# Patient Record
Sex: MALE | Race: NATIVE HAWAIIAN OR OTHER PACIFIC ISLANDER | Employment: UNEMPLOYED | URBAN - METROPOLITAN AREA
[De-identification: names, ages, dates, MRNs, and addresses within clinical notes are randomized per-mention and may not be internally consistent; named-entity substitution may affect disease eponyms.]

---

## 2017-06-30 ENCOUNTER — APPOINTMENT (OUTPATIENT)
Dept: GENERAL RADIOLOGY | Facility: CLINIC | Age: 42
End: 2017-06-30
Attending: EMERGENCY MEDICINE
Payer: MEDICAID

## 2017-06-30 ENCOUNTER — HOSPITAL ENCOUNTER (EMERGENCY)
Facility: CLINIC | Age: 42
Discharge: HOME | End: 2017-06-30
Attending: EMERGENCY MEDICINE | Admitting: EMERGENCY MEDICINE
Payer: MEDICAID

## 2017-06-30 VITALS
TEMPERATURE: 97.4 F | HEART RATE: 80 BPM | SYSTOLIC BLOOD PRESSURE: 120 MMHG | RESPIRATION RATE: 16 BRPM | OXYGEN SATURATION: 95 % | DIASTOLIC BLOOD PRESSURE: 80 MMHG

## 2017-06-30 DIAGNOSIS — T73.3XXA OVEREXERTION FROM PROLONGED STATIC POSITION: ICD-10-CM
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
## 2017-06-30 DIAGNOSIS — S93.402A SPRAIN OF LEFT ANKLE, UNSPECIFIED LIGAMENT, INITIAL ENCOUNTER: ICD-10-CM

## 2017-06-30 PROCEDURE — 25000132 ZZH RX MED GY IP 250 OP 250 PS 637: Performed by: EMERGENCY MEDICINE

## 2017-06-30 PROCEDURE — 73610 X-RAY EXAM OF ANKLE: CPT | Mod: LT

## 2017-06-30 PROCEDURE — 99283 EMERGENCY DEPT VISIT LOW MDM: CPT

## 2017-06-30 PROCEDURE — 99283 EMERGENCY DEPT VISIT LOW MDM: CPT | Mod: Z6 | Performed by: EMERGENCY MEDICINE

## 2017-06-30 RX ORDER — ACETAMINOPHEN 500 MG
1000 TABLET ORAL ONCE
Status: COMPLETED | OUTPATIENT
Start: 2017-06-30 | End: 2017-06-30

## 2017-06-30 RX ADMIN — ACETAMINOPHEN 1000 MG: 500 TABLET ORAL at 21:03

## 2017-06-30 NOTE — LETTER
[facility], [place], EMERGENCY DEPARTMENT  [address]  [facility] [place] [address]  [phone]    2017    Farshad Gunter  [address] [place] [address]  [phone] (home)     : 1975      To Whom it may concern:    Farshad Gunter was seen in our Emergency Department today, 2017. He has an ankle sprain. Crutches are recommended for the next 3 days.  He may attend classes or any activity which does not require significant ambulation or lifting that would interfere with his use of crutches.       Sincerely,        Jacobo Altamirano

## 2017-06-30 NOTE — ED AVS SNAPSHOT
[facility], [place], Emergency Department    [address]    [facility] [address]    Phone:  [phone]    Fax:  [phone]                                       Farshad Gunter   MRN: [id number]    Department:  [facility], Emergency Department   Date of Visit:  6/30/2017           After Visit Summary Signature Page     I have received my discharge instructions, and my questions have been answered. I have discussed any challenges I see with this plan with the nurse or doctor.    ..........................................................................................................................................  Patient/Patient Representative Signature      ..........................................................................................................................................  Patient Representative Print Name and Relationship to Patient    ..................................................               ................................................  Date                                            Time    ..........................................................................................................................................  Reviewed by Signature/Title    ...................................................              ..............................................  Date                                                            Time

## 2017-07-01 NOTE — DISCHARGE INSTRUCTIONS
Please make an appointment to follow up with Your Primary Care Provider in 7 days even if entirely better.  You can call to discuss the appropriate follow up timing with your doctor.         Treating Ankle Sprains  Treatment will depend on how bad your sprain is. For a severe sprain, healing may take 3 months or more.  Right after your injury: Use R.I.C.E.    Rest: At first, keep weight off the ankle as much as you can. You may be given crutches to help you walk without putting weight on the ankle.    Ice: Put an ice pack on the ankle for 15 minutes. Remove the pack and wait at least 30 minutes. Repeat for up to 3 days. This helps reduce swelling.    Compression: To reduce swelling and keep the joint stable, you may need to wrap the ankle with an elastic bandage. For more severe sprains, you may need an ankle brace or a cast.    Elevation: To reduce swelling, keep your ankle raised above your heart when you sit or lie down.  Medicine  Your healthcare provider may suggest oral non-steroidal anti-inflammatory medicine (NSAIDs), such as ibuprofen. This relieves the pain and helps reduce any swelling. Be sure to take your medicine as directed.  Contrast baths  After 3 days, soak your ankle in warm water for 30 seconds, then in cool water for 30 seconds. Go back and forth for 5 minutes. Doing this every 2 hours will help keep the swelling down.  Exercises    After about 2 to 3 weeks, you may be given exercises to strengthen the ligaments and muscles in the ankle. Doing these exercises will help prevent another ankle sprain. Exercises may include standing on your toes and then on your heels and doing ankle curls.  Ankle curls    Sit on the edge of a sturdy table or lie on your back.    Pull your toes toward you. Then point them away from you. Repeat for 2 to 3 minutes.   Date Last Reviewed: 9/28/2015 2000-2017 [employer]. [address] [place] [address], [place] [address]. All rights reserved. This information is  not intended as a substitute for professional medical care. Always follow your healthcare professional's instructions.

## 2017-07-01 NOTE — ED PROVIDER NOTES
History     Chief Complaint   Patient presents with     Ankle Pain     moving furniture on Monday of this week, trip and rolled left ankle on the the lst couple of stairs; heard a crack; has numbness, burning and tingling in foot; strong pedal pulse; swelling about the left ankle and into foot     HPI  Farshad Gunter is a 41-year-old male no past medical history presents for left ankle pain. Inverted his ankle while moving heavy furniture 4 days ago. Able to ambulate but progressively experienced increasing pain now difficult to walk. No fevers or chills no other trauma no rashes otherwise well.   This part of the document was transcribed by Cristian Ortiz Scribe.     I have reviewed the Medications, Allergies, Past Medical and Surgical History, and Social History in the [employer] system.    PAST MEDICAL HISTORY:   Past Medical History:   Diagnosis Date     Diverticulitis      Injury of right hand      Obesity        PAST SURGICAL HISTORY:   Past Surgical History:   Procedure Laterality Date     C LAP,SURG,COLECTOMY, PARTIAL, W/ANAST  11/23/12    [place]     LAPAROSCOPIC CHOLECYSTECTOMY WITH CHOLANGIOGRAMS  6/12/2013    Procedure: LAPAROSCOPIC CHOLECYSTECTOMY WITH CHOLANGIOGRAMS;  Laparoscopic Cholecystectomy with Cholangiograms;  Surgeon: Thalia Levy MD;  Location:  OR     OPEN REDUCTION INTERNAL FIXATION FINGER(S)  2/17/2012    Procedure:OPEN REDUCTION INTERNAL FIXATION FINGER(S); Open Reduction Internal Fixation of Fourth Metacarpal Fracture; Surgeon:LEXIE KENT; Location: OR     ORTHOPEDIC SURGERY      Right hand surgery in 1996.       FAMILY HISTORY: No family history on file.    SOCIAL HISTORY:   Social History   Substance Use Topics     Smoking status: Former Smoker     Packs/day: 0.10     Types: Cigarettes     Smokeless tobacco: Former User     Alcohol use No      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         Review of Systems   12 point review of symptoms was performed and  is negative except as noted above.     Physical Exam   BP: 108/66  Pulse: 75  Temp: 97.4  F (36.3  C)  Resp: 16  SpO2: 96 %  Physical Exam  GEN: Well appearing, non toxic, cooperative and conversant.   HEENT: The head is normocephalic and atraumatic. Pupils are equal round and reactive to light. Extraocular motions are intact. There is no facial swelling. The neck is nontender and supple.   EXT: left ankle tenderness to palpation along the medial and lateral malleoli with associated swelling. No gross deformity. EHL FHL TA 5 out of 5 sensory intact to light touch DP 2+, range of motion limited by pain. No tenderness to palpation along the midfoot forefoot or calcaneus or base of the fifth metatarsal.   NEURO: Cranial nerves II through XII are intact and symmetric. Bilateral upper and lower extremities grossly show full range of motion without any focal deficits.   SKIN: No rashes, ecchymosis, or lacerations  PSYCH: Calm and cooperative, interactive.     ED Course     ED Course     Procedures        Recent Results (from the past 24 hour(s))   Ankle XR, G/E 3 views, left    Narrative    LEFT ANKLE THREE VIEWS   6/30/2017 7:31 PM     HISTORY: Inversion injury. Ankle pain.    COMPARISON: None.      Impression    IMPRESSION:   1. No convincing acute fracture or malalignment of the left ankle.  2. A 0.5 cm ossific opacity at the inferior tip of the medial  malleolus and a few tiny ossific opacities at inferior tip of the  lateral malleolus likely relate to prior avulsion injury.  3. Moderate soft tissue swelling over the lateral malleolus.  4. Mild degenerative changes in the left tibiotalar joint.   5. Plantar calcaneal spur.    WILI OWENS MD            Labs Ordered and Resulted from Time of ED Arrival Up to the Time of Departure from the ED - No data to display         Assessments & Plan (with Medical Decision Making)   41-year-old male with ankle inversion is noted  X-ray shows no acute fracture  Pain treated with  Tylenol, Ibuprofen  - RICE  - WBAT, crutches PRN comfort- rec use for 3 days for ligamentous rest.   - PCP 1 week    - Patient ready and eager for discharge. Care plan, follow up plan, and reasons to return immediately to the ED were dicussed with the patient and summarized as noted in the discharge instructions.        This part of the document was transcribed by Lorenza Mckeon Medical Scribe.       I have reviewed the nursing notes.    I have reviewed the findings, diagnosis, plan and need for follow up with the patient.    New Prescriptions    No medications on file       Final diagnoses:   Sprain of left ankle, unspecified ligament, initial encounter       6/30/2017   [facility], [place], EMERGENCY DEPARTMENT     Jacobo Altamirano MD  06/30/17 6633

## 2017-11-11 ENCOUNTER — APPOINTMENT (OUTPATIENT)
Dept: CT IMAGING | Facility: CLINIC | Age: 42
End: 2017-11-11
Attending: FAMILY MEDICINE
Payer: MEDICAID

## 2017-11-11 ENCOUNTER — HOSPITAL ENCOUNTER (EMERGENCY)
Facility: CLINIC | Age: 42
Discharge: HOME | End: 2017-11-11
Attending: FAMILY MEDICINE | Admitting: FAMILY MEDICINE
Payer: MEDICAID

## 2017-11-11 VITALS
BODY MASS INDEX: 42.67 KG/M2 | RESPIRATION RATE: 16 BRPM | OXYGEN SATURATION: 99 % | TEMPERATURE: 98 F | SYSTOLIC BLOOD PRESSURE: 114 MMHG | WEIGHT: 297.4 LBS | HEART RATE: 90 BPM | DIASTOLIC BLOOD PRESSURE: 66 MMHG

## 2017-11-11 DIAGNOSIS — R10.32 ABDOMINAL PAIN, LEFT LOWER QUADRANT: ICD-10-CM

## 2017-11-11 DIAGNOSIS — K92.1 BLOOD IN STOOL: ICD-10-CM

## 2017-11-11 DIAGNOSIS — F10.10 ALCOHOL ABUSE, EPISODIC DRINKING BEHAVIOR: ICD-10-CM

## 2017-11-11 LAB
ABO + RH BLD: NORMAL
ABO + RH BLD: NORMAL
ALBUMIN SERPL-MCNC: 2.8 G/DL (ref 3.4–5)
ALP SERPL-CCNC: 105 U/L (ref 40–150)
ALT SERPL W P-5'-P-CCNC: 179 U/L (ref 0–70)
ANION GAP SERPL CALCULATED.3IONS-SCNC: 7 MMOL/L (ref 3–14)
AST SERPL W P-5'-P-CCNC: 439 U/L (ref 0–45)
BASOPHILS # BLD AUTO: 0.1 10E9/L (ref 0–0.2)
BASOPHILS NFR BLD AUTO: 0.6 %
BILIRUB SERPL-MCNC: 0.4 MG/DL (ref 0.2–1.3)
BLD GP AB SCN SERPL QL: NORMAL
BLOOD BANK CMNT PATIENT-IMP: NORMAL
BUN SERPL-MCNC: 11 MG/DL (ref 7–30)
CALCIUM SERPL-MCNC: 7.8 MG/DL (ref 8.5–10.1)
CHLORIDE SERPL-SCNC: 112 MMOL/L (ref 94–109)
CO2 SERPL-SCNC: 26 MMOL/L (ref 20–32)
CREAT SERPL-MCNC: 0.99 MG/DL (ref 0.66–1.25)
DIFFERENTIAL METHOD BLD: ABNORMAL
EOSINOPHIL # BLD AUTO: 0.2 10E9/L (ref 0–0.7)
EOSINOPHIL NFR BLD AUTO: 2.4 %
ERYTHROCYTE [DISTWIDTH] IN BLOOD BY AUTOMATED COUNT: 15.9 % (ref 10–15)
GFR SERPL CREATININE-BSD FRML MDRD: 82 ML/MIN/1.7M2
GLUCOSE SERPL-MCNC: 128 MG/DL (ref 70–99)
HCT VFR BLD AUTO: 43.5 % (ref 40–53)
HGB BLD-MCNC: 14.1 G/DL (ref 13.3–17.7)
IMM GRANULOCYTES # BLD: 0.1 10E9/L (ref 0–0.4)
IMM GRANULOCYTES NFR BLD: 0.5 %
LACTATE BLD-SCNC: 1.9 MMOL/L (ref 0.7–2)
LIPASE SERPL-CCNC: 283 U/L (ref 73–393)
LYMPHOCYTES # BLD AUTO: 2.2 10E9/L (ref 0.8–5.3)
LYMPHOCYTES NFR BLD AUTO: 23.2 %
MCH RBC QN AUTO: 28.7 PG (ref 26.5–33)
MCHC RBC AUTO-ENTMCNC: 32.4 G/DL (ref 31.5–36.5)
MCV RBC AUTO: 88 FL (ref 78–100)
MONOCYTES # BLD AUTO: 0.6 10E9/L (ref 0–1.3)
MONOCYTES NFR BLD AUTO: 5.8 %
NEUTROPHILS # BLD AUTO: 6.4 10E9/L (ref 1.6–8.3)
NEUTROPHILS NFR BLD AUTO: 67.5 %
NRBC # BLD AUTO: 0 10*3/UL
NRBC BLD AUTO-RTO: 0 /100
PLATELET # BLD AUTO: 203 10E9/L (ref 150–450)
POTASSIUM SERPL-SCNC: 4.1 MMOL/L (ref 3.4–5.3)
PROT SERPL-MCNC: 6.4 G/DL (ref 6.8–8.8)
RBC # BLD AUTO: 4.92 10E12/L (ref 4.4–5.9)
SODIUM SERPL-SCNC: 145 MMOL/L (ref 133–144)
SPECIMEN EXP DATE BLD: NORMAL
WBC # BLD AUTO: 9.5 10E9/L (ref 4–11)

## 2017-11-11 PROCEDURE — 25000125 ZZHC RX 250: Performed by: FAMILY MEDICINE

## 2017-11-11 PROCEDURE — 86900 BLOOD TYPING SEROLOGIC ABO: CPT | Performed by: FAMILY MEDICINE

## 2017-11-11 PROCEDURE — 96376 TX/PRO/DX INJ SAME DRUG ADON: CPT

## 2017-11-11 PROCEDURE — 99285 EMERGENCY DEPT VISIT HI MDM: CPT | Mod: Z6 | Performed by: FAMILY MEDICINE

## 2017-11-11 PROCEDURE — 96361 HYDRATE IV INFUSION ADD-ON: CPT | Mod: 59

## 2017-11-11 PROCEDURE — 25000128 H RX IP 250 OP 636: Performed by: FAMILY MEDICINE

## 2017-11-11 PROCEDURE — 80053 COMPREHEN METABOLIC PANEL: CPT | Performed by: FAMILY MEDICINE

## 2017-11-11 PROCEDURE — 96375 TX/PRO/DX INJ NEW DRUG ADDON: CPT

## 2017-11-11 PROCEDURE — 96374 THER/PROPH/DIAG INJ IV PUSH: CPT | Mod: 59

## 2017-11-11 PROCEDURE — 83605 ASSAY OF LACTIC ACID: CPT | Performed by: FAMILY MEDICINE

## 2017-11-11 PROCEDURE — 86901 BLOOD TYPING SEROLOGIC RH(D): CPT | Performed by: FAMILY MEDICINE

## 2017-11-11 PROCEDURE — 85025 COMPLETE CBC W/AUTO DIFF WBC: CPT | Performed by: FAMILY MEDICINE

## 2017-11-11 PROCEDURE — 86850 RBC ANTIBODY SCREEN: CPT | Performed by: FAMILY MEDICINE

## 2017-11-11 PROCEDURE — 83690 ASSAY OF LIPASE: CPT | Performed by: FAMILY MEDICINE

## 2017-11-11 PROCEDURE — 99285 EMERGENCY DEPT VISIT HI MDM: CPT | Mod: 25

## 2017-11-11 PROCEDURE — 74177 CT ABD & PELVIS W/CONTRAST: CPT

## 2017-11-11 RX ORDER — SODIUM CHLORIDE 9 MG/ML
1000 INJECTION, SOLUTION INTRAVENOUS CONTINUOUS
Status: DISCONTINUED | OUTPATIENT
Start: 2017-11-11 | End: 2017-11-12 | Stop reason: HOSPADM

## 2017-11-11 RX ORDER — HYDROMORPHONE HYDROCHLORIDE 1 MG/ML
0.5 INJECTION, SOLUTION INTRAMUSCULAR; INTRAVENOUS; SUBCUTANEOUS ONCE
Status: COMPLETED | OUTPATIENT
Start: 2017-11-11 | End: 2017-11-11

## 2017-11-11 RX ORDER — HYDROMORPHONE HCL/0.9% NACL/PF 0.2MG/0.2
0.2 SYRINGE (ML) INTRAVENOUS
Status: COMPLETED | OUTPATIENT
Start: 2017-11-11 | End: 2017-11-11

## 2017-11-11 RX ORDER — PANTOPRAZOLE SODIUM 40 MG/1
40 TABLET, DELAYED RELEASE ORAL DAILY
Qty: 30 TABLET | Refills: 0 | Status: SHIPPED | OUTPATIENT
Start: 2017-11-11 | End: 2017-12-11

## 2017-11-11 RX ORDER — IOPAMIDOL 755 MG/ML
100 INJECTION, SOLUTION INTRAVASCULAR ONCE
Status: COMPLETED | OUTPATIENT
Start: 2017-11-11 | End: 2017-11-11

## 2017-11-11 RX ORDER — ONDANSETRON 2 MG/ML
4 INJECTION INTRAMUSCULAR; INTRAVENOUS
Status: COMPLETED | OUTPATIENT
Start: 2017-11-11 | End: 2017-11-11

## 2017-11-11 RX ADMIN — SODIUM CHLORIDE 1000 ML: 9 INJECTION, SOLUTION INTRAVENOUS at 22:02

## 2017-11-11 RX ADMIN — HYDROMORPHONE HYDROCHLORIDE 0.5 MG: 1 INJECTION, SOLUTION INTRAMUSCULAR; INTRAVENOUS; SUBCUTANEOUS at 20:52

## 2017-11-11 RX ADMIN — IOPAMIDOL 98 ML: 755 INJECTION, SOLUTION INTRAVENOUS at 21:23

## 2017-11-11 RX ADMIN — SODIUM CHLORIDE 1000 ML: 9 INJECTION, SOLUTION INTRAVENOUS at 20:15

## 2017-11-11 RX ADMIN — Medication 0.2 MG: at 20:14

## 2017-11-11 RX ADMIN — SODIUM CHLORIDE 74 ML: 9 INJECTION, SOLUTION INTRAVENOUS at 21:23

## 2017-11-11 RX ADMIN — ONDANSETRON 4 MG: 2 INJECTION INTRAMUSCULAR; INTRAVENOUS at 20:14

## 2017-11-11 NOTE — ED AVS SNAPSHOT
[facility], Emergency Department    [address]    [place] [address]    Phone:  [phone]    Fax:  [phone]                                       Farshad Gunter   MRN: [id number]    Department:  [facility], Emergency Department   Date of Visit:  11/11/2017           Patient Information     Date Of Birth          1975        Your diagnoses for this visit were:     Abdominal pain, left lower quadrant     Blood in stool     Alcohol abuse, episodic drinking behavior        You were seen by Cesar Scruggs MD.        Discharge Instructions       Thank you for choosing [facility].     Please closely monitor for further symptoms. Return to the Emergency Department if you develop any new or worsening signs or symptoms.    If you received any opiate pain medications or sedatives during your visit, please do not drive for at least 8 hours.     Labs, cultures or final xray interpretations may still need to be reviewed.  We will call you if your plan of care needs to be changed.    Please make an appointment to follow up with Your Primary Care Provider in the next 5-7 days, and GI Clinic (phone: [phone]) for further evaluation of hematochezia and small mass or nodule seen on your CT scan and discussed with you today, as well as your elevated liver enzymes which are likely due to alcohol abuse.  Please refrain from alcohol and start Protonix.    If you desire chemical dependency assessment or counseling, follow up with [facility]: [phone]      Discharge References/Attachments     ABDOMINAL PAIN, ADULT (ENGLISH)    LOWER GI BLEEDING (STABLE) (ENGLISH)    ALCOHOL ABUSE (ENGLISH)      24 Hour Appointment Hotline       To make an appointment at any [facility] clinic, call [phone] ([phone]). If you don't have a family doctor or clinic, we will help you find one. [facility] clinics are conveniently located to serve the needs of you and your  family.             Review of your medicines      START taking        Dose / Directions Last dose taken    pantoprazole 40 MG EC tablet   Commonly known as:  PROTONIX   Dose:  40 mg   Quantity:  30 tablet        Take 1 tablet (40 mg) by mouth daily for 30 doses   Refills:  0          Our records show that you are taking the medicines listed below. If these are incorrect, please call your family doctor or clinic.        Dose / Directions Last dose taken    IBUPROFEN PO   Dose:  800 mg        Take 800 mg by mouth   Refills:  0        TYLENOL PO        Refills:  0                Prescriptions were sent or printed at these locations (1 Prescription)                   Other Prescriptions                Printed at Department/Unit printer (1 of 1)         pantoprazole (PROTONIX) 40 MG EC tablet                Procedures and tests performed during your visit     ABO/Rh type and screen    CBC with platelets differential    CT Abdomen Pelvis w Contrast    Comprehensive metabolic panel    Give 20 ounces of water 15 minutes before CT of abdomen    Lactic acid whole blood    Lipase      Orders Needing Specimen Collection     None      Pending Results     No orders found from 11/9/2017 to 11/12/2017.            Pending Culture Results     No orders found from 11/9/2017 to 11/12/2017.            Pending Results Instructions     If you had any lab results that were not finalized at the time of your Discharge, you can call the ED Lab Result RN at [phone]. You will be contacted by this team for any positive Lab results or changes in treatment. The nurses are available 7 days a week from 10A to 6:30P.  You can leave a message 24 hours per day and they will return your call.        Thank you for choosing [facility]       Thank you for choosing [facility] for your care. Our goal is always to provide you with excellent care. Hearing back from our patients is one way we can continue to improve our services. Please take a few minutes to  "complete the written survey that you may receive in the mail after you visit with us. Thank you!        [facility] Information     [employer] lets you send messages to your doctor, view your test results, renew your prescriptions, schedule appointments and more. To sign up, go to [email or web address] . Click on \"Log in\" on the left side of the screen, which will take you to the Welcome page. Then click on \"Sign up Now\" on the right side of the page.     You will be asked to enter the access code listed below, as well as some personal information. Please follow the directions to create your username and password.     Your access code is: [email or web address]  Expires: 2018 10:50 PM     Your access code will  in 90 days. If you need help or a new code, please call your [facility] clinic or [phone].        Care EveryWhere ID     This is your Care EveryWhere ID. This could be used by other organizations to access your [facility] medical records  [id number]        Equal Access to Services     [facility] : Hadii guillaume bradshawo Soaleta, waaxda luqadaha, qaybta kaalmada adefareed, bo barba . So [facility] [place] [phone].    ATENCIÓN: Si habla español, tiene a dalton disposición servicios gratuitos de asistencia lingüística. Llame al [phone].    We comply with applicable federal civil rights laws and [place] laws. We do not discriminate on the basis of race, color, national origin, age, disability, sex, sexual orientation, or gender identity.            After Visit Summary       This is your record. Keep this with you and show to your community pharmacist(s) and doctor(s) at your next visit.                  "

## 2017-11-11 NOTE — ED AVS SNAPSHOT
[facility], [place], Emergency Department    [address]    [facility] [address]    Phone:  [phone]    Fax:  [phone]                                       Farshad Gunter   MRN: [id number]    Department:  [facility], Emergency Department   Date of Visit:  11/11/2017           After Visit Summary Signature Page     I have received my discharge instructions, and my questions have been answered. I have discussed any challenges I see with this plan with the nurse or doctor.    ..........................................................................................................................................  Patient/Patient Representative Signature      ..........................................................................................................................................  Patient Representative Print Name and Relationship to Patient    ..................................................               ................................................  Date                                            Time    ..........................................................................................................................................  Reviewed by Signature/Title    ...................................................              ..............................................  Date                                                            Time

## 2017-11-12 NOTE — DISCHARGE INSTRUCTIONS
Thank you for choosing [facility].     Please closely monitor for further symptoms. Return to the Emergency Department if you develop any new or worsening signs or symptoms.    If you received any opiate pain medications or sedatives during your visit, please do not drive for at least 8 hours.     Labs, cultures or final xray interpretations may still need to be reviewed.  We will call you if your plan of care needs to be changed.    Please make an appointment to follow up with Your Primary Care Provider in the next 5-7 days, and GI Clinic (phone: [phone]) for further evaluation of hematochezia and small mass or nodule seen on your CT scan and discussed with you today, as well as your elevated liver enzymes which are likely due to alcohol abuse.  Please refrain from alcohol and start Protonix.    If you desire chemical dependency assessment or counseling, follow up with [facility]: [phone]

## 2017-11-12 NOTE — ED PROVIDER NOTES
"  History     Chief Complaint   Patient presents with     Abdominal Pain     Onset at 4 pm tonight bright red blood noted on toilet paper after having bowel movement, now having abdominal pain.     HPI  Farshad Gunter is a 41 year old male with a past medical history of obesity and diverticulitis who presents to the Emergency Department today for evaluation of abdominal pain and blood per rectum. The patient states that he had a bowel movement around 2 PM today that had blood in the stool and blood in the toilet bowel. He then had another episode of this before coming into the ED. He denies his stool being diarrhea and he states that his stool was otherwise normal besides the blood. He denies having rectal pain with his bowel movement. The patient also complains of abdominal pain that is most significant in his LLQ with some mild pain in his RLQ. He is also having some lightheadedness. Denies chills or fever.  He does admit he has been consuming very large quantities of alcohol in the last few weeks.  Denies any history of DTs or seizures but states he has had a \"ulcer\".    Upon review of Care Everywhere, the patient had a laparoscopic single incision left colectomy converted to open left colectomy done at [facility] in [place] on 11/23/12 for recurrent diverticulitis.     I have reviewed the Medications, Allergies, Past Medical and Surgical History, and Social History in the [facility] system.    Past Medical History:   Diagnosis Date     Diverticulitis      Injury of right hand      Obesity        Past Surgical History:   Procedure Laterality Date     C LAP,SURG,COLECTOMY, PARTIAL, W/ANAST  11/23/12    [place]     LAPAROSCOPIC CHOLECYSTECTOMY WITH CHOLANGIOGRAMS  6/12/2013    Procedure: LAPAROSCOPIC CHOLECYSTECTOMY WITH CHOLANGIOGRAMS;  Laparoscopic Cholecystectomy with Cholangiograms;  Surgeon: Thalia Levy MD;  Location: [facility] OR     OPEN REDUCTION INTERNAL FIXATION FINGER(S)  2/17/2012 "    Procedure:OPEN REDUCTION INTERNAL FIXATION FINGER(S); Open Reduction Internal Fixation of Fourth Metacarpal Fracture; Surgeon:LEXIE KENT; Location:[facility] OR     ORTHOPEDIC SURGERY      Right hand surgery in 1996.       No family history on file.    Social History   Substance Use Topics     Smoking status: Former Smoker     Packs/day: 0.10     Types: Cigarettes     Smokeless tobacco: Former User     Alcohol use No      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       Current Facility-Administered Medications   Medication     0.9% sodium chloride infusion     sodium chloride 0.9 % bag 500mL for CT scan flush use     Current Outpatient Prescriptions   Medication     pantoprazole (PROTONIX) 40 MG EC tablet     IBUPROFEN PO     Acetaminophen (TYLENOL PO)      No Known Allergies     Review of Systems   ROS: 10 point ROS neg other than the symptoms noted above in the HPI.   Physical Exam   BP: 111/64  Pulse: 90  Temp: 98  F (36.7  C)  Resp: 16  Weight: 134.9 kg (297 lb 6.4 oz)  SpO2: 99 %      Physical Exam   Constitutional: He is oriented to person, place, and time. He appears well-developed and well-nourished.   HENT:   Head: Normocephalic and atraumatic.   Mouth/Throat: Oropharynx is clear and moist.   Eyes: EOM are normal. Pupils are equal, round, and reactive to light.   Neck: Normal range of motion. Neck supple. No tracheal deviation present. No thyromegaly present.   Cardiovascular: Normal rate, regular rhythm, normal heart sounds and intact distal pulses.  Exam reveals no gallop and no friction rub.    No murmur heard.  Pulmonary/Chest: Effort normal and breath sounds normal. He exhibits no tenderness.   Abdominal: Soft. Bowel sounds are normal. He exhibits no distension and no mass. There is tenderness in the left lower quadrant. There is no rigidity, no rebound and no CVA tenderness.   Genitourinary: Rectum normal, testes normal and penis normal. Rectal exam shows no internal hemorrhoid, no mass, no tenderness, anal  tone normal and guaiac negative stool.   Musculoskeletal: He exhibits no edema or tenderness.   Neurological: He is alert and oriented to person, place, and time. No cranial nerve deficit. Coordination normal.   Skin: Skin is warm and dry. No rash noted.   Psychiatric: He has a normal mood and affect. His behavior is normal.   Nursing note and vitals reviewed.      ED Course   7:39 PM  The patient was seen and examined by Dr. Scruggs in Room 18.    ED Course     Procedures             Critical Care time:  none             Labs Ordered and Resulted from Time of ED Arrival Up to the Time of Departure from the ED   CBC WITH PLATELETS DIFFERENTIAL - Abnormal; Notable for the following:        Result Value    RDW 15.9 (*)     All other components within normal limits   COMPREHENSIVE METABOLIC PANEL - Abnormal; Notable for the following:     Sodium 145 (*)     Chloride 112 (*)     Glucose 128 (*)     Calcium 7.8 (*)     Albumin 2.8 (*)     Protein Total 6.4 (*)      (*)      (*)     All other components within normal limits   LACTIC ACID WHOLE BLOOD   LIPASE   FREE WATER   ABO/RH TYPE AND SCREEN            Assessments & Plan (with Medical Decision Making)   Patient with a previous history of diverticulitis now presenting with left sided abdominal pain and hematochezia.  Differential diagnosis includes gastritis or peptic ulcer disease, duodenitis, colitis, diverticulitis, internal hemorrhoid, anal fissure, pancreatitis, hepatitis, small bowel obstruction, vascular catastrophe.  Patient's vital signs are normal.  He appears in no distress and his cardiovascular exam is normal.  He has tenderness in the left lower quadrant of his abdomen without peritoneal signs.  His rectal exam is normal, with Hemoccult negative stool.  His white blood cell count hemoglobin and platelets are all normal.  Comprehensive metabolic panel consistent with his known history of recent heavy alcohol consumption.  He is not acutely  intoxicated.  His lactate and lipase are normal.  His CT of the abdomen and pelvis does not show any findings concerning for left lower quadrant pain or hematochezia.  There is a small 1.4 cm mass or nodule along the anterior proximal duodenum which is not likely to be related to his acute symptoms but will need further evaluation possibly with endoscopy.  There are no findings to support any of the eye threatening considerations for his acute symptoms and no sign of hemodynamically unstable GI bleed.  Patient reported significant resolution of his symptoms while in the emergency department and certainly had no further episodes of hematochezia and remained hemodynamically stable.  He does appear stable to proceed for further evaluation as outpatient.  I have discussed the small mass or nodule with the patient and his family and they assure me he will [facility].  I will start him on a proton pump inhibitor and he agrees to refrain from alcohol.  Discussed expected course, need for follow up, and indications for return with the patient.  See discharge instructions.      I have reviewed the nursing notes.    I have reviewed the findings, diagnosis, plan and need for follow up with the patient.    New Prescriptions    PANTOPRAZOLE (PROTONIX) 40 MG EC TABLET    Take 1 tablet (40 mg) by mouth daily for 30 doses       Final diagnoses:   Abdominal pain, left lower quadrant   Blood in stool   Alcohol abuse, episodic drinking behavior   I, Julio Cesar Lopez, am serving as a trained medical scribe to document services personally performed by Ramana Scruggs MD, based on the provider's statements to me.   Ramana RUSSO MD, was physically present and have reviewed and verified the accuracy of this note documented by Julio Cesar Lopez.     11/11/2017   [facility], EMERGENCY DEPARTMENT     Cesar Scruggs MD  11/11/17 1203

## 2019-09-14 ENCOUNTER — HOSPITAL ENCOUNTER (EMERGENCY)
Facility: CLINIC | Age: 44
Discharge: HOME | End: 2019-09-14
Attending: EMERGENCY MEDICINE | Admitting: EMERGENCY MEDICINE
Payer: COMMERCIAL

## 2019-09-14 VITALS
RESPIRATION RATE: 18 BRPM | TEMPERATURE: 97.6 F | SYSTOLIC BLOOD PRESSURE: 136 MMHG | WEIGHT: 263 LBS | HEART RATE: 84 BPM | OXYGEN SATURATION: 98 % | HEIGHT: 69 IN | BODY MASS INDEX: 38.95 KG/M2 | DIASTOLIC BLOOD PRESSURE: 78 MMHG

## 2019-09-14 DIAGNOSIS — R09.81 NASAL CONGESTION: ICD-10-CM

## 2019-09-14 DIAGNOSIS — K12.2 UVULITIS: ICD-10-CM

## 2019-09-14 LAB
ANION GAP SERPL CALCULATED.3IONS-SCNC: 6 MMOL/L (ref 3–14)
BASOPHILS # BLD AUTO: 0.1 10E9/L (ref 0–0.2)
BASOPHILS NFR BLD AUTO: 0.6 %
BUN SERPL-MCNC: 9 MG/DL (ref 7–30)
CALCIUM SERPL-MCNC: 8.4 MG/DL (ref 8.5–10.1)
CHLORIDE SERPL-SCNC: 107 MMOL/L (ref 94–109)
CO2 SERPL-SCNC: 28 MMOL/L (ref 20–32)
CREAT SERPL-MCNC: 0.96 MG/DL (ref 0.66–1.25)
CRP SERPL-MCNC: 11 MG/L (ref 0–8)
DIFFERENTIAL METHOD BLD: ABNORMAL
EOSINOPHIL # BLD AUTO: 0.2 10E9/L (ref 0–0.7)
EOSINOPHIL NFR BLD AUTO: 1.6 %
ERYTHROCYTE [DISTWIDTH] IN BLOOD BY AUTOMATED COUNT: 14.6 % (ref 10–15)
GFR SERPL CREATININE-BSD FRML MDRD: >90 ML/MIN/{1.73_M2}
GLUCOSE SERPL-MCNC: 110 MG/DL (ref 70–99)
HCT VFR BLD AUTO: 52.6 % (ref 40–53)
HGB BLD-MCNC: 16.5 G/DL (ref 13.3–17.7)
IMM GRANULOCYTES # BLD: 0.1 10E9/L (ref 0–0.4)
IMM GRANULOCYTES NFR BLD: 0.5 %
LYMPHOCYTES # BLD AUTO: 1.5 10E9/L (ref 0.8–5.3)
LYMPHOCYTES NFR BLD AUTO: 14 %
MCH RBC QN AUTO: 28.4 PG (ref 26.5–33)
MCHC RBC AUTO-ENTMCNC: 31.4 G/DL (ref 31.5–36.5)
MCV RBC AUTO: 91 FL (ref 78–100)
MONOCYTES # BLD AUTO: 0.5 10E9/L (ref 0–1.3)
MONOCYTES NFR BLD AUTO: 4.2 %
NEUTROPHILS # BLD AUTO: 8.4 10E9/L (ref 1.6–8.3)
NEUTROPHILS NFR BLD AUTO: 79.1 %
NRBC # BLD AUTO: 0 10*3/UL
NRBC BLD AUTO-RTO: 0 /100
PLATELET # BLD AUTO: 224 10E9/L (ref 150–450)
POTASSIUM SERPL-SCNC: 3.6 MMOL/L (ref 3.4–5.3)
RBC # BLD AUTO: 5.81 10E12/L (ref 4.4–5.9)
SODIUM SERPL-SCNC: 141 MMOL/L (ref 133–144)
WBC # BLD AUTO: 10.6 10E9/L (ref 4–11)

## 2019-09-14 PROCEDURE — 85025 COMPLETE CBC W/AUTO DIFF WBC: CPT | Performed by: EMERGENCY MEDICINE

## 2019-09-14 PROCEDURE — 99285 EMERGENCY DEPT VISIT HI MDM: CPT | Mod: Z6 | Performed by: EMERGENCY MEDICINE

## 2019-09-14 PROCEDURE — 31575 DIAGNOSTIC LARYNGOSCOPY: CPT

## 2019-09-14 PROCEDURE — 80048 BASIC METABOLIC PNL TOTAL CA: CPT | Performed by: EMERGENCY MEDICINE

## 2019-09-14 PROCEDURE — 96375 TX/PRO/DX INJ NEW DRUG ADDON: CPT | Mod: 59

## 2019-09-14 PROCEDURE — 99285 EMERGENCY DEPT VISIT HI MDM: CPT | Mod: 25

## 2019-09-14 PROCEDURE — 96374 THER/PROPH/DIAG INJ IV PUSH: CPT

## 2019-09-14 PROCEDURE — 25000132 ZZH RX MED GY IP 250 OP 250 PS 637: Performed by: EMERGENCY MEDICINE

## 2019-09-14 PROCEDURE — 86140 C-REACTIVE PROTEIN: CPT | Performed by: EMERGENCY MEDICINE

## 2019-09-14 PROCEDURE — 25000128 H RX IP 250 OP 636: Performed by: EMERGENCY MEDICINE

## 2019-09-14 RX ORDER — PSEUDOEPHEDRINE HCL 30 MG
30 TABLET ORAL EVERY 4 HOURS PRN
Qty: 30 TABLET | Refills: 0 | Status: SHIPPED | OUTPATIENT
Start: 2019-09-14

## 2019-09-14 RX ORDER — DIPHENHYDRAMINE HYDROCHLORIDE 50 MG/ML
25 INJECTION INTRAMUSCULAR; INTRAVENOUS ONCE
Status: COMPLETED | OUTPATIENT
Start: 2019-09-14 | End: 2019-09-14

## 2019-09-14 RX ORDER — KETOROLAC TROMETHAMINE 30 MG/ML
30 INJECTION, SOLUTION INTRAMUSCULAR; INTRAVENOUS ONCE
Status: COMPLETED | OUTPATIENT
Start: 2019-09-14 | End: 2019-09-14

## 2019-09-14 RX ORDER — METHYLPREDNISOLONE 4 MG
TABLET, DOSE PACK ORAL
Qty: 21 TABLET | Refills: 0 | Status: SHIPPED | OUTPATIENT
Start: 2019-09-14

## 2019-09-14 RX ORDER — DEXAMETHASONE SODIUM PHOSPHATE 10 MG/ML
10 INJECTION, SOLUTION INTRAMUSCULAR; INTRAVENOUS ONCE
Status: COMPLETED | OUTPATIENT
Start: 2019-09-14 | End: 2019-09-14

## 2019-09-14 RX ORDER — IBUPROFEN 600 MG/1
600 TABLET, FILM COATED ORAL EVERY 8 HOURS PRN
Qty: 30 TABLET | Refills: 0 | Status: SHIPPED | OUTPATIENT
Start: 2019-09-14

## 2019-09-14 RX ADMIN — KETOROLAC TROMETHAMINE 30 MG: 30 INJECTION, SOLUTION INTRAMUSCULAR at 11:51

## 2019-09-14 RX ADMIN — DIPHENHYDRAMINE HYDROCHLORIDE 25 MG: 50 INJECTION, SOLUTION INTRAMUSCULAR; INTRAVENOUS at 11:50

## 2019-09-14 RX ADMIN — Medication 1 SPRAY: at 14:28

## 2019-09-14 RX ADMIN — DEXAMETHASONE SODIUM PHOSPHATE 10 MG: 10 INJECTION, SOLUTION INTRAMUSCULAR; INTRAVENOUS at 11:50

## 2019-09-14 ASSESSMENT — MIFFLIN-ST. JEOR: SCORE: 2073.34

## 2019-09-14 ASSESSMENT — ENCOUNTER SYMPTOMS
SORE THROAT: 1
TROUBLE SWALLOWING: 1

## 2019-09-14 NOTE — ED TRIAGE NOTES
Pt presents to ED with swollen throat. Started having sore throat last night and woke up this am with swelling, SOB, and voice changes. Has never happened before.

## 2019-09-14 NOTE — ED PROVIDER NOTES
History     Chief Complaint   Patient presents with     Oral Swelling     HPI  Farshad Gunter is a 43 year old male with a history of obesity and diverticulitis, who presents to the Emergency Department for evaluation of oral swelling. Patient reports that he went to the movies last night. He developed oral discomfort afterwards and this morning noticed the swelling of the back of his throat. He continues to have pain. He is unable to swallow his secretions. He has never had this. He denies any tongue swelling. He denies any coughs or trismus. He had rhinorrhea this morning along with shortness of breath. He denies starting any new medications. He reportedly took Trazodone this morning.     I have reviewed the Medications, Allergies, Past Medical and Surgical History, and Social History in the [facility] system.    Past Medical History:   Diagnosis Date     Diverticulitis      Injury of right hand      Obesity        Past Surgical History:   Procedure Laterality Date     C LAP,SURG,COLECTOMY, PARTIAL, W/ANAST  11/23/12    [place]     LAPAROSCOPIC CHOLECYSTECTOMY WITH CHOLANGIOGRAMS  6/12/2013    Procedure: LAPAROSCOPIC CHOLECYSTECTOMY WITH CHOLANGIOGRAMS;  Laparoscopic Cholecystectomy with Cholangiograms;  Surgeon: Thalia Levy MD;  Location:  OR     OPEN REDUCTION INTERNAL FIXATION FINGER(S)  2/17/2012    Procedure:OPEN REDUCTION INTERNAL FIXATION FINGER(S); Open Reduction Internal Fixation of Fourth Metacarpal Fracture; Surgeon:LEXIE KENT; Location: OR     ORTHOPEDIC SURGERY      Right hand surgery in 1996.       History reviewed. No pertinent family history.    Social History     Tobacco Use     Smoking status: Former Smoker     Packs/day: 0.10     Types: Cigarettes     Smokeless tobacco: Former User   Substance Use Topics     Alcohol use: No     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     No current facility-administered medications for this encounter.      Current Outpatient Medications  "  Medication     Acetaminophen (TYLENOL PO)     IBUPROFEN PO      No Known Allergies      Review of Systems   HENT: Positive for sore throat and trouble swallowing.         Positive for oral swelling   All other systems reviewed and are negative.      Physical Exam   BP: 126/85  Pulse: 84  Temp: 97.6  F (36.4  C)  Resp: 18  Height: 175.3 cm (5' 9\")  Weight: 119.3 kg (263 lb)  SpO2: 99 %      Physical Exam   Constitutional: He is oriented to person, place, and time. He appears well-developed and well-nourished.   HENT:   Head: Normocephalic and atraumatic.   Enlarged uvula x 4 normal size;  Fluid filled and appears like large teardrop; no exudate or swelling or infection of tonsils; no trismus; no erythema of posterior pharynx; large overall size of tongue but no swelling; able to fully open mouth and move neck;  Some sputum is being suctioned   Neck: Normal range of motion. Neck supple.   Cardiovascular: Normal rate, regular rhythm and normal heart sounds.   Pulmonary/Chest: Effort normal. No stridor. No respiratory distress. He has no wheezes.   Abdominal: Soft. He exhibits no distension. There is no tenderness. There is no rebound.   Neurological: He is alert and oriented to person, place, and time.   Skin: Skin is warm.   Psychiatric: He has a normal mood and affect. His behavior is normal. Thought content normal.       ED Course        Procedures             Critical Care time:  none         Results for orders placed or performed during the hospital encounter of 09/14/19   CBC with platelets differential   Result Value Ref Range    WBC 10.6 4.0 - 11.0 10e9/L    RBC Count 5.81 4.4 - 5.9 10e12/L    Hemoglobin 16.5 13.3 - 17.7 g/dL    Hematocrit 52.6 40.0 - 53.0 %    MCV 91 78 - 100 fl    MCH 28.4 26.5 - 33.0 pg    MCHC 31.4 (L) 31.5 - 36.5 g/dL    RDW 14.6 10.0 - 15.0 %    Platelet Count 224 150 - 450 10e9/L    Diff Method Automated Method     % Neutrophils 79.1 %    % Lymphocytes 14.0 %    % Monocytes 4.2 %    % " Eosinophils 1.6 %    % Basophils 0.6 %    % Immature Granulocytes 0.5 %    Nucleated RBCs 0 0 /100    Absolute Neutrophil 8.4 (H) 1.6 - 8.3 10e9/L    Absolute Lymphocytes 1.5 0.8 - 5.3 10e9/L    Absolute Monocytes 0.5 0.0 - 1.3 10e9/L    Absolute Eosinophils 0.2 0.0 - 0.7 10e9/L    Absolute Basophils 0.1 0.0 - 0.2 10e9/L    Abs Immature Granulocytes 0.1 0 - 0.4 10e9/L    Absolute Nucleated RBC 0.0    Basic metabolic panel   Result Value Ref Range    Sodium 141 133 - 144 mmol/L    Potassium 3.6 3.4 - 5.3 mmol/L    Chloride 107 94 - 109 mmol/L    Carbon Dioxide 28 20 - 32 mmol/L    Anion Gap 6 3 - 14 mmol/L    Glucose 110 (H) 70 - 99 mg/dL    Urea Nitrogen 9 7 - 30 mg/dL    Creatinine 0.96 0.66 - 1.25 mg/dL    GFR Estimate >90 >60 mL/min/[1.73_m2]    GFR Estimate If Black >90 >60 mL/min/[1.73_m2]    Calcium 8.4 (L) 8.5 - 10.1 mg/dL   CRP inflammation   Result Value Ref Range    CRP Inflammation 11.0 (H) 0.0 - 8.0 mg/L     Medications   dexamethasone PF (DECADRON) injection 10 mg (10 mg Intravenous Given 9/14/19 1150)   ketorolac (TORADOL) injection 30 mg (30 mg Intravenous Given 9/14/19 1151)   diphenhydrAMINE (BENADRYL) injection 25 mg (25 mg Intravenous Given 9/14/19 1150)   phenylephrine (KAI-SYNEPHRINE) 0.25 % spray 1 spray (1 spray Nasal Given 9/14/19 1428)            Labs Ordered and Resulted from Time of ED Arrival Up to the Time of Departure from the ED - No data to display         Assessments & Plan (with Medical Decision Making)   Is a very nice 43-year-old male who presented to the ER complaining of waking up with sore throat difficulty swallowing.  Patient on exam and a very enlarged uvula with no other signs of deep infection of the posterior pharynx.  Patient was seen by ENT who came and scoped the patient.  They agree that patient has no symptoms below the uvula but does have blocked left nasopharyngeal passage as well as an enlarged uvula.  They are unsure what the symptoms are from but recommend a  Medrol Dosepak.  Symptoms are possibly due to a viral infection.  Patient here was monitored in the ER for several hours to make sure his symptoms are improved.  Upon 2 other visits patient's uvula slowly started to improve and patient was feeling better.  He was able to swallow his sputum and was able to drink liquids.  Patient received some Afrin and felt like his nasal congestion had improved.  Patient will be discharged home instructions to follow-up as needed.  Patient stable for discharge    I have reviewed the nursing notes.    I have reviewed the findings, diagnosis, plan and need for follow up with the patient.    New Prescriptions    No medications on file       Final diagnoses:   Uvulitis   Nasal congestion     IAlba, am serving as a trained medical scribe to document services personally performed by Sahra Rodrigues MD, based on the provider's statements to me.   Sahra RUSSO MD, was physically present and have reviewed and verified the accuracy of this note documented by Alba Carver.    9/14/2019   [facility], [place], EMERGENCY DEPARTMENT     Sahra Rodrigues MD  09/14/19 1304

## 2019-09-14 NOTE — ED AVS SNAPSHOT
[facility], [place], Emergency Department  [address] [place] [id number]  Phone:  [phone]                                    Farshad Gunter   MRN: [id number]    Department:  [facility], Emergency Department   Date of Visit:  9/14/2019           After Visit Summary Signature Page    I have received my discharge instructions, and my questions have been answered. I have discussed any challenges I see with this plan with the nurse or doctor.    ..........................................................................................................................................  Patient/Patient Representative Signature      ..........................................................................................................................................  Patient Representative Print Name and Relationship to Patient    ..................................................               ................................................  Date                                   Time    ..........................................................................................................................................  Reviewed by Signature/Title    ...................................................              ..............................................  Date                                               Time          22EPIC Rev 08/18

## 2019-09-14 NOTE — CONSULTS
Otolaryngology Consult Note  September 14, 2019    CC: uvular swelling    HPI: Farshad Gunter is a 43 year old male who is otherwise healthy.  He states that he was at a movie last night and after the movie around midnight he started having a sore throat and felt like he could not breathe out of the left nostril.  He went to bed he woke up this morning with slightly increased sore throat and a feeling that it was difficult to breathe especially with laying back.  He felt like his voice was a little bit more muffled this morning as well states that is since improved.  He never had any noisy breathing throughout this process.  States that he otherwise has been feeling healthy no fevers or chills.  He does not have a history of this in the past.  He did not inhale any medications. He does not have any food allergies or family history of angioedema.     Past Medical History:   Diagnosis Date     Diverticulitis      Injury of right hand      Obesity      Past Surgical History:   Procedure Laterality Date     C LAP,SURG,COLECTOMY, PARTIAL, W/ANAST  11/23/12    [place]     LAPAROSCOPIC CHOLECYSTECTOMY WITH CHOLANGIOGRAMS  6/12/2013    Procedure: LAPAROSCOPIC CHOLECYSTECTOMY WITH CHOLANGIOGRAMS;  Laparoscopic Cholecystectomy with Cholangiograms;  Surgeon: Thalia Levy MD;  Location:  OR     OPEN REDUCTION INTERNAL FIXATION FINGER(S)  2/17/2012    Procedure:OPEN REDUCTION INTERNAL FIXATION FINGER(S); Open Reduction Internal Fixation of Fourth Metacarpal Fracture; Surgeon:LEXIE KENT; Location: OR     ORTHOPEDIC SURGERY      Right hand surgery in 1996.     Current Outpatient Medications   Medication Sig Dispense Refill     Acetaminophen (TYLENOL PO)        IBUPROFEN PO Take 800 mg by mouth        No Known Allergies    Social History     Socioeconomic History     Marital status: Single     Spouse name: Not on file     Number of children: Not on file     Years of education: Not on file     Highest  "education level: Not on file   Occupational History     Not on file   Social Needs     Financial resource strain: Not on file     Food insecurity:     Worry: Not on file     Inability: Not on file     Transportation needs:     Medical: Not on file     Non-medical: Not on file   Tobacco Use     Smoking status: Former Smoker     Packs/day: 0.10     Types: Cigarettes     Smokeless tobacco: Former User   Substance and Sexual Activity     Alcohol use: No     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     Drug use: No     Sexual activity: Yes     Partners: Female   Lifestyle     Physical activity:     Days per week: Not on file     Minutes per session: Not on file     Stress: Not on file   Relationships     Social connections:     Talks on phone: Not on file     Gets together: Not on file     Attends Congregational service: Not on file     Active member of club or organization: Not on file     Attends meetings of clubs or organizations: Not on file     Relationship status: Not on file     Intimate partner violence:     Fear of current or ex partner: Not on file     Emotionally abused: Not on file     Physically abused: Not on file     Forced sexual activity: Not on file   Other Topics Concern     Parent/sibling w/ CABG, MI or angioplasty before 65F 55M? Not Asked   Social History Narrative     Not on file       History reviewed. No pertinent family history.    ROS: 12 point review of systems is negative unless noted in HPI.    PHYSICAL EXAM:  General: laying in bed, no acute distress, obese  BP (!) 132/91   Pulse 84   Temp 97.6  F (36.4  C) (Oral)   Resp 18   Ht 1.753 m (5' 9\")   Wt 119.3 kg (263 lb)   SpO2 100%   BMI 38.84 kg/m    HEAD: normocephalic, atraumatic  Face: symmetrical, CN VII intact bilaterally (HB 1), no swelling, edema, or erythema.  Eyes: EOMI without spontaneous or gaze evoked nystagmus, PERRL, clear sclera  Ears: no tragal tenderness, external ear canal open and clear bilaterally, TMs clear " bilaterally  Nose: left nasal passage is very edematous with thin clear secretions, right nasal cavity WNL  Mouth: moist, no ulcers, no jaw or tooth tenderness, tongue midline and symmetric, no floor of mouth swelling, dentition poor in some areas but no surrounding erythema  Oropharynx: tonsils within normal limits, uvula midline and enlarged with minor erythema  Neck: no LAD, trachea midline, ROM WNL    FIBEROPTIC ENDOSCOPY:  Due to concern for airway swelling, fiberoptic laryngoscopy was indicated. After obtaining verbal consent the fiberoptic laryngoscope was passed under endoscopic vision through the right nasal passage. The nasopharynx was edematous without erythema. The eustachian tubes were clear. The soft palate appeared slightly swollen with some edema but with good mobility. The epiglottis was sharp, and the visualized portion of the vallecula was clear. The larynx was clear with mobile cords. The arytenoids were clear, and there was no pooling in the hypopharynx.    ROUTINE IP LABS (Last four results)  BMP  Recent Labs   Lab 09/14/19  1142      POTASSIUM 3.6   CHLORIDE 107   HERMINIA 8.4*   CO2 28   BUN 9   CR 0.96   *     CBC  Recent Labs   Lab 09/14/19  1142   WBC 10.6   RBC 5.81   HGB 16.5   HCT 52.6   MCV 91   MCH 28.4   MCHC 31.4*   RDW 14.6        INRNo lab results found in last 7 days.    Assessment and Plan  Farshad Gunter is a 43 year old male otherwise healthy with a 12 hour history of sore throat and edema of the left nare and subjective swelling with edematous left nasal passage, nasopharynx and uvula.    - Unknown etiology of swelling, possibly viral though unlikely, more likely reactive to unknown insult/trigger.  - recommend monitoring for the next couple of hours now that he has received systemic steroids and benadryl. If there is symptomatic improvement and the visualized swelling appears to be decreasing then patient is ok to discharge to home with steroid dose pack as  well as benadryl.  - Antibiotics are likely not necessary as there does not appear to be a bacterial infectious etiology  - If patient worsens or does not improve in the next few hours with medications on board he may require overnight observation with continued IV steroids until swelling begins to improve.    Patient discussed with Chief resident on call as well as staff Dr. Landen Ferrara MD  PGY-3  Otolaryngology-Head & Neck Surgery  Please page ENT with questions by dialing [phone] and entering job code [phone] when prompted.    I, Margy Schuster MD, discussed this patient with the resident/fellow at the time of consultation. I have reviewed the note, labs, imaging and am in agreement with the assessment and plan. I did not see the patient. Recommend following acutely until swelling begins to subside and symptomatically improved. Send home with steroids and benadryl.   Margy Schuster MD

## 2020-09-24 ENCOUNTER — APPOINTMENT (OUTPATIENT)
Dept: CT IMAGING | Facility: CLINIC | Age: 45
End: 2020-09-24
Attending: EMERGENCY MEDICINE
Payer: MEDICAID

## 2020-09-24 ENCOUNTER — HOSPITAL ENCOUNTER (EMERGENCY)
Facility: CLINIC | Age: 45
Discharge: HOME | End: 2020-09-24
Attending: EMERGENCY MEDICINE | Admitting: EMERGENCY MEDICINE
Payer: MEDICAID

## 2020-09-24 VITALS
HEIGHT: 68 IN | TEMPERATURE: 98.6 F | WEIGHT: 265 LBS | OXYGEN SATURATION: 99 % | BODY MASS INDEX: 40.16 KG/M2 | RESPIRATION RATE: 18 BRPM | HEART RATE: 89 BPM | SYSTOLIC BLOOD PRESSURE: 120 MMHG | DIASTOLIC BLOOD PRESSURE: 87 MMHG

## 2020-09-24 DIAGNOSIS — G51.0 LEFT-SIDED BELL'S PALSY: ICD-10-CM

## 2020-09-24 DIAGNOSIS — R42 DIZZINESS: ICD-10-CM

## 2020-09-24 LAB
ALBUMIN SERPL-MCNC: 3.2 G/DL (ref 3.4–5)
ALP SERPL-CCNC: 119 U/L (ref 40–150)
ALT SERPL W P-5'-P-CCNC: 51 U/L (ref 0–70)
ANION GAP SERPL CALCULATED.3IONS-SCNC: 4 MMOL/L (ref 3–14)
AST SERPL W P-5'-P-CCNC: 71 U/L (ref 0–45)
BASOPHILS # BLD AUTO: 0.1 10E9/L (ref 0–0.2)
BASOPHILS NFR BLD AUTO: 1.4 %
BILIRUB SERPL-MCNC: 0.4 MG/DL (ref 0.2–1.3)
BUN SERPL-MCNC: 10 MG/DL (ref 7–30)
CALCIUM SERPL-MCNC: 8.8 MG/DL (ref 8.5–10.1)
CHLORIDE SERPL-SCNC: 113 MMOL/L (ref 94–109)
CO2 SERPL-SCNC: 24 MMOL/L (ref 20–32)
CREAT SERPL-MCNC: 0.96 MG/DL (ref 0.66–1.25)
DIFFERENTIAL METHOD BLD: NORMAL
EOSINOPHIL # BLD AUTO: 0.1 10E9/L (ref 0–0.7)
EOSINOPHIL NFR BLD AUTO: 2.1 %
ERYTHROCYTE [DISTWIDTH] IN BLOOD BY AUTOMATED COUNT: 14.3 % (ref 10–15)
GFR SERPL CREATININE-BSD FRML MDRD: >90 ML/MIN/{1.73_M2}
GLUCOSE SERPL-MCNC: 124 MG/DL (ref 70–99)
HCT VFR BLD AUTO: 49.1 % (ref 40–53)
HGB BLD-MCNC: 15.7 G/DL (ref 13.3–17.7)
IMM GRANULOCYTES # BLD: 0 10E9/L (ref 0–0.4)
IMM GRANULOCYTES NFR BLD: 0.5 %
LYMPHOCYTES # BLD AUTO: 1.9 10E9/L (ref 0.8–5.3)
LYMPHOCYTES NFR BLD AUTO: 28.4 %
MAGNESIUM SERPL-MCNC: 2.1 MG/DL (ref 1.6–2.3)
MCH RBC QN AUTO: 27.6 PG (ref 26.5–33)
MCHC RBC AUTO-ENTMCNC: 32 G/DL (ref 31.5–36.5)
MCV RBC AUTO: 86 FL (ref 78–100)
MONOCYTES # BLD AUTO: 0.3 10E9/L (ref 0–1.3)
MONOCYTES NFR BLD AUTO: 5 %
NEUTROPHILS # BLD AUTO: 4.1 10E9/L (ref 1.6–8.3)
NEUTROPHILS NFR BLD AUTO: 62.6 %
NRBC # BLD AUTO: 0 10*3/UL
NRBC BLD AUTO-RTO: 0 /100
PLATELET # BLD AUTO: 285 10E9/L (ref 150–450)
POTASSIUM SERPL-SCNC: 4 MMOL/L (ref 3.4–5.3)
PROT SERPL-MCNC: 7.4 G/DL (ref 6.8–8.8)
RBC # BLD AUTO: 5.68 10E12/L (ref 4.4–5.9)
SODIUM SERPL-SCNC: 140 MMOL/L (ref 133–144)
WBC # BLD AUTO: 6.6 10E9/L (ref 4–11)

## 2020-09-24 PROCEDURE — 99285 EMERGENCY DEPT VISIT HI MDM: CPT | Mod: 25 | Performed by: EMERGENCY MEDICINE

## 2020-09-24 PROCEDURE — 93010 ELECTROCARDIOGRAM REPORT: CPT | Mod: Z6 | Performed by: EMERGENCY MEDICINE

## 2020-09-24 PROCEDURE — 80053 COMPREHEN METABOLIC PANEL: CPT | Performed by: EMERGENCY MEDICINE

## 2020-09-24 PROCEDURE — 70450 CT HEAD/BRAIN W/O DYE: CPT

## 2020-09-24 PROCEDURE — 83735 ASSAY OF MAGNESIUM: CPT | Performed by: EMERGENCY MEDICINE

## 2020-09-24 PROCEDURE — 93005 ELECTROCARDIOGRAM TRACING: CPT | Performed by: EMERGENCY MEDICINE

## 2020-09-24 PROCEDURE — 85025 COMPLETE CBC W/AUTO DIFF WBC: CPT | Performed by: EMERGENCY MEDICINE

## 2020-09-24 RX ORDER — PREDNISONE 20 MG/1
TABLET ORAL
Qty: 15 TABLET | Refills: 0 | Status: SHIPPED | OUTPATIENT
Start: 2020-09-24

## 2020-09-24 RX ORDER — MECLIZINE HYDROCHLORIDE 25 MG/1
25 TABLET ORAL 4 TIMES DAILY PRN
Qty: 15 TABLET | Refills: 0 | Status: SHIPPED | OUTPATIENT
Start: 2020-09-24

## 2020-09-24 RX ORDER — VALACYCLOVIR HYDROCHLORIDE 1 G/1
1000 TABLET, FILM COATED ORAL 3 TIMES DAILY
Qty: 21 TABLET | Refills: 0 | Status: SHIPPED | OUTPATIENT
Start: 2020-09-24 | End: 2020-10-01

## 2020-09-24 RX ORDER — POLYVINYL ALCOHOL 14 MG/ML
1 SOLUTION/ DROPS OPHTHALMIC PRN
Qty: 15 ML | Refills: 0 | Status: SHIPPED | OUTPATIENT
Start: 2020-09-24

## 2020-09-24 ASSESSMENT — MIFFLIN-ST. JEOR: SCORE: 2061.53

## 2020-09-24 NOTE — DISCHARGE INSTRUCTIONS
TODAY'S VISIT:  You were seen today for left sided facial droop, dizziness  -   - If you had any labs or imaging/radiology tests performed today, you should also discuss these tests with your usual provider.     FOLLOW-UP:  Please make an appointment to follow up with:  - Your Primary Care Provider. If you do not have a PCP, please call the [facility] (phone: [phone] for an appointment    - Have your provider review the results from today's visit with you again to make sure no further follow-up or additional testing is needed based on those results.     PRESCRIPTIONS / MEDICATIONS:  - antivert  - prednisone  - acyclovir  - artificial tears eye drops    OTHER INSTRUCTIONS:  - make sure to drink plenty of fluids    RETURN TO THE EMERGENCY DEPARTMENT  Return to the Emergency Department at any time for any new or worsening symptoms or any concerns.

## 2020-09-24 NOTE — ED PROVIDER NOTES
History     Chief Complaint   Patient presents with     Neurologic Problem     HPI  Farshad Gunter is a 44 year old male with a past medical history of diverticulitis, obesity who presents to the emergency department with a chief complaint of left-sided facial droop.  The patient states that he thinks his symptoms may have been present for a couple of weeks now, but he became concerned after an acquaintance of his pointed it out to him when they saw him more recently.  The patient states he is also had some associated dizziness and had some high blood pressures up to the 110s diastolic.  No arm or leg weakness.  He does have an occipital headache that was gradual onset.  He does not take any blood thinning medications.    I have reviewed the Medications, Allergies, Past Medical and Surgical History, and Social History in the [employer] system.    Past Medical History:   Diagnosis Date     Diverticulitis      Injury of right hand      Obesity      Past Surgical History:   Procedure Laterality Date     C LAP,SURG,COLECTOMY, PARTIAL, W/ANAST  11/23/12    [place]     LAPAROSCOPIC CHOLECYSTECTOMY WITH CHOLANGIOGRAMS  6/12/2013    Procedure: LAPAROSCOPIC CHOLECYSTECTOMY WITH CHOLANGIOGRAMS;  Laparoscopic Cholecystectomy with Cholangiograms;  Surgeon: Thalia Levy MD;  Location:  OR     OPEN REDUCTION INTERNAL FIXATION FINGER(S)  2/17/2012    Procedure:OPEN REDUCTION INTERNAL FIXATION FINGER(S); Open Reduction Internal Fixation of Fourth Metacarpal Fracture; Surgeon:LEXIE KENT; Location: OR     ORTHOPEDIC SURGERY      Right hand surgery in 1996.     No current facility-administered medications for this encounter.      Current Outpatient Medications   Medication     Acetaminophen (TYLENOL PO)     ibuprofen (ADVIL/MOTRIN) 600 MG tablet     methylPREDNISolone (MEDROL DOSEPAK) 4 MG tablet therapy pack     pseudoePHEDrine (SUDAFED) 30 MG tablet     No Known Allergies  Past medical history, past surgical  history, medications, and allergies were reviewed with the patient. Additional pertinent items: None    Social History     Socioeconomic History     Marital status: Single     Spouse name: Not on file     Number of children: Not on file     Years of education: Not on file     Highest education level: Not on file   Occupational History     Not on file   Social Needs     Financial resource strain: Not on file     Food insecurity     Worry: Not on file     Inability: Not on file     Transportation needs     Medical: Not on file     Non-medical: Not on file   Tobacco Use     Smoking status: Former Smoker     Packs/day: 0.10     Types: Cigarettes     Smokeless tobacco: Former User   Substance and Sexual Activity     Alcohol use: No     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     Drug use: No     Sexual activity: Yes     Partners: Female   Lifestyle     Physical activity     Days per week: Not on file     Minutes per session: Not on file     Stress: Not on file   Relationships     Social connections     Talks on phone: Not on file     Gets together: Not on file     Attends [employer] service: Not on file     Active member of club or organization: Not on file     Attends meetings of clubs or organizations: Not on file     Relationship status: Not on file     Intimate partner violence     Fear of current or ex partner: Not on file     Emotionally abused: Not on file     Physically abused: Not on file     Forced sexual activity: Not on file   Other Topics Concern     Parent/sibling w/ CABG, MI or angioplasty before 65F 55M? Not Asked   Social History Narrative     Not on file     Social history was reviewed with the patient. Additional pertinent items: None    Review of Systems  General: No fevers or chills  Skin: No rash or diaphoresis  Eyes: No eye redness or discharge  Ears/Nose/Throat: No rhinorrhea or nasal congestion  Respiratory: No cough or SOB  Cardiovascular: No chest pain or palpitations  Gastrointestinal: No nausea,  "vomiting, or diarrhea  Genitourinary: No urinary frequency, hematuria, or dysuria  Musculoskeletal: No arthralgias or myalgias  Neurologic: See HPI  Hematologic/Lymphatic/Immunologic: No leg swelling, no easy bruising/bleeding  Endocrine: No polyuria/polydypsia    A complete review of systems was performed with pertinent positives and negatives noted in the HPI, and all other systems negative.    Physical Exam   BP: 120/87  Pulse: 89  Temp: 98.6  F (37  C)  Resp: 18  Height: 172.7 cm (5' 8\")  Weight: 120.2 kg (265 lb)  SpO2: 99 %      General: Well nourished, well developed, NAD  HEENT: EOMI, anicteric. NCAT, MMM  Neck: no jugular venous distension, supple, nl ROM  Cardiac: Regular rate and rhythm. No murmurs, rubs, or gallops. Normal S1, S2.  Intact peripheral pulses  Pulm: CTAB, no stridor, wheezes, rales, rhonchi  Skin: Warm and dry to the touch.  No rash  Extremities: No LE edema, no cyanosis, w/w/p  Neuro: Alert and oriented x 4, left-sided facial droop is present that does not spare the forehead, cannot tightly close his left eye, but he can bring upper and lower eyelids together, no problems with closure of right eye, CN II-XII otherwise intact, strength 5/5 all 4 extremities, sensation intact throughout, steady gait, no nystagmus, coordination normal as tested. PERRL, EOMI.  No pronator drift.  Speech is clear without aphasia or dysarthria.      ED Course        Procedures             EKG Interpretation:      Interpreted by Nora Palma MD  Time reviewed: 1445  Symptoms at time of EKG: dizziness   Rhythm: normal sinus   Rate: normal, 82 bpm  Axis: normal  Ectopy: none  Conduction: normal  ST Segments/ T Waves: No ST-T wave changes  Q Waves: none  Comparison to prior: Unchanged from 10/5/2013    Clinical Impression: normal EKG                        Labs Ordered and Resulted from Time of ED Arrival Up to the Time of Departure from the ED   COMPREHENSIVE METABOLIC PANEL - Abnormal; Notable for the " following components:       Result Value    Chloride 113 (*)     Glucose 124 (*)     Albumin 3.2 (*)     AST 71 (*)     All other components within normal limits   CBC WITH PLATELETS DIFFERENTIAL   MAGNESIUM            Results for orders placed or performed during the hospital encounter of 09/24/20 (from the past 24 hour(s))   CBC with platelets differential   Result Value Ref Range    WBC 6.6 4.0 - 11.0 10e9/L    RBC Count 5.68 4.4 - 5.9 10e12/L    Hemoglobin 15.7 13.3 - 17.7 g/dL    Hematocrit 49.1 40.0 - 53.0 %    MCV 86 78 - 100 fl    MCH 27.6 26.5 - 33.0 pg    MCHC 32.0 31.5 - 36.5 g/dL    RDW 14.3 10.0 - 15.0 %    Platelet Count 285 150 - 450 10e9/L    Diff Method Automated Method     % Neutrophils 62.6 %    % Lymphocytes 28.4 %    % Monocytes 5.0 %    % Eosinophils 2.1 %    % Basophils 1.4 %    % Immature Granulocytes 0.5 %    Nucleated RBCs 0 0 /100    Absolute Neutrophil 4.1 1.6 - 8.3 10e9/L    Absolute Lymphocytes 1.9 0.8 - 5.3 10e9/L    Absolute Monocytes 0.3 0.0 - 1.3 10e9/L    Absolute Eosinophils 0.1 0.0 - 0.7 10e9/L    Absolute Basophils 0.1 0.0 - 0.2 10e9/L    Abs Immature Granulocytes 0.0 0 - 0.4 10e9/L    Absolute Nucleated RBC 0.0    Comprehensive metabolic panel   Result Value Ref Range    Sodium 140 133 - 144 mmol/L    Potassium 4.0 3.4 - 5.3 mmol/L    Chloride 113 (H) 94 - 109 mmol/L    Carbon Dioxide 24 20 - 32 mmol/L    Anion Gap 4 3 - 14 mmol/L    Glucose 124 (H) 70 - 99 mg/dL    Urea Nitrogen 10 7 - 30 mg/dL    Creatinine 0.96 0.66 - 1.25 mg/dL    GFR Estimate >90 >60 mL/min/[1.73_m2]    GFR Estimate If Black >90 >60 mL/min/[1.73_m2]    Calcium 8.8 8.5 - 10.1 mg/dL    Bilirubin Total 0.4 0.2 - 1.3 mg/dL    Albumin 3.2 (L) 3.4 - 5.0 g/dL    Protein Total 7.4 6.8 - 8.8 g/dL    Alkaline Phosphatase 119 40 - 150 U/L    ALT 51 0 - 70 U/L    AST 71 (H) 0 - 45 U/L   Magnesium   Result Value Ref Range    Magnesium 2.1 1.6 - 2.3 mg/dL   EKG 12-lead, tracing only   Result Value Ref Range     Interpretation ECG Click View Image link to view waveform and result    CT Head w/o Contrast    Narrative    CT HEAD W/O CONTRAST 9/24/2020 3:51 PM    History: dizziness, headache, left facial droop (likely Bell's palsy)   ICD-10:    Comparison: None available    Technique: Using multidetector thin collimation helical acquisition  technique, axial, coronal and sagittal CT images from the skull base  to the vertex were obtained without intravenous contrast.    Findings: There is no intracranial hemorrhage, mass effect, or midline  shift. Gray/white matter differentiation in both cerebral hemispheres  is preserved. Ventricles are proportionate to the cerebral sulci. The  basal cisterns are clear. 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    The bony calvaria and the bones of the skull base are normal. Old  right nasal bone deformity. Polypoid mucosal thickening in the left  maxillary sinus. Mastoid air cells are clear.    Small left posterior scalp hematoma.      Impression    Impression:  1. No acute intracranial pathology.   2. Small left posterior scalp hematoma without underlying fracture.  3. 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          I have personally reviewed the examination and initial interpretation  and I agree with the findings.    VIOLETTE WILLIAM MD       Labs, vital signs, and imaging studies were reviewed by me.    Medications - No data to display    Assessments & Plan (with Medical Decision Making)   Farshad Gunter is a 44 year old male who presents with left-sided facial droop.  Exam is consistent with Bell's palsy as this does not spare the forehead area and there are no speech changes or arm/leg weakness or other neurologic findings on exam.  Patient does report some associated dizziness.  No nystagmus seen on exam.  EKG is normal.  Labs were ordered to further evaluate the patient.  Patient reported concerned about high blood pressures at home up to the 110s diastolic, he states he cannot remember what the  systolic was.  Blood pressure in the emergency department is normal at 120/87.    Laboratory work-up is remarkable for mild AST elevation, improved from prior values in our system per chart review.    CT shows no acute disease process.    I have reviewed the nursing notes.    I have reviewed the findings, diagnosis, plan and need for follow up with the patient.    Patient to be discharged home. Advised to follow up with PCP within 1 week. To return to ER immediately with any new/worsening symptoms. Plan of care discussed with patient who expresses understanding and agrees with plan of care.    Patient advised to stay well-hydrated.  Prescription for Antivert given to treat patient's dizziness.  Give prednisone and valacyclovir were given to treat Bell's palsy as time of onset is somewhat unclear.  As patient has some difficulty closing his left eye, artificial tears were also prescribed.    Discharge Medication List as of 9/24/2020  4:17 PM      START taking these medications    Details   meclizine (ANTIVERT) 25 MG tablet Take 1 tablet (25 mg) by mouth 4 times daily as needed for dizziness, Disp-15 tablet,R-0, Local Print      polyvinyl alcohol (LIQUIFILM TEARS) 1.4 % ophthalmic solution Place 1 drop Into the left eye as needed for dry eyes, Disp-15 mL,R-0, Local Print      predniSONE (DELTASONE) 20 MG tablet Take three tablets (= 60mg) each day for 5 (five) days, Disp-15 tablet,R-0, Local Print      valACYclovir (VALTREX) 1000 mg tablet Take 1 tablet (1,000 mg) by mouth 3 times daily for 7 days, Disp-21 tablet,R-0, Local Print             Final diagnoses:   Left-sided Bell's palsy   Dizziness       9/24/2020   [facility], [place], EMERGENCY DEPARTMENT     Nora Palma MD  09/24/20 5073

## 2020-09-24 NOTE — ED TRIAGE NOTES
"Triage Assessment & Note:    There were no vitals taken for this visit.    Patient presents with: C/o left side of face \"not working\" PT is noted to be unable to close his left eye, c/o pain at the back of his head on the left side. PT does report some difficulty with swallowing. PT reports no changes to his speech. PT denies any left arm or leg weakness. PT is noted to have left facial droop and inability to move his facial muscles. PT eyes PERRL. Strength is = bilaterally. PT reports the symptoms started several weeks ago.     Home Treatments/Remedies: None    Febrile / Afebrile? Afebrile     Duration of C/o:Several weeks     Ted Hale RN  September 24, 2020      "

## 2020-09-24 NOTE — ED AVS SNAPSHOT
[facility], [place], Emergency Department  [address] [place] [address]  Phone:  [phone]                                    Farshad Gunter   MRN: [id number]    Department:  [facility], Emergency Department   Date of Visit:  9/24/2020           After Visit Summary Signature Page    I have received my discharge instructions, and my questions have been answered. I have discussed any challenges I see with this plan with the nurse or doctor.    ..........................................................................................................................................  Patient/Patient Representative Signature      ..........................................................................................................................................  Patient Representative Print Name and Relationship to Patient    ..................................................               ................................................  Date                                   Time    ..........................................................................................................................................  Reviewed by Signature/Title    ...................................................              ..............................................  Date                                               Time          22EPIC Rev 08/18

## 2020-09-25 LAB — INTERPRETATION ECG - MUSE: NORMAL

## 2020-11-01 ENCOUNTER — APPOINTMENT (OUTPATIENT)
Dept: GENERAL RADIOLOGY | Facility: CLINIC | Age: 45
End: 2020-11-01
Attending: EMERGENCY MEDICINE
Payer: MEDICAID

## 2020-11-01 ENCOUNTER — HOSPITAL ENCOUNTER (EMERGENCY)
Facility: CLINIC | Age: 45
Discharge: HOME | End: 2020-11-01
Attending: EMERGENCY MEDICINE | Admitting: EMERGENCY MEDICINE
Payer: MEDICAID

## 2020-11-01 VITALS
DIASTOLIC BLOOD PRESSURE: 68 MMHG | HEART RATE: 83 BPM | RESPIRATION RATE: 16 BRPM | SYSTOLIC BLOOD PRESSURE: 110 MMHG | OXYGEN SATURATION: 98 % | BODY MASS INDEX: 38.16 KG/M2 | TEMPERATURE: 98.1 F | WEIGHT: 251 LBS

## 2020-11-01 DIAGNOSIS — S60.131A CONTUSION OF RIGHT MIDDLE FINGER WITH DAMAGE TO NAIL, INITIAL ENCOUNTER: ICD-10-CM

## 2020-11-01 PROCEDURE — 73140 X-RAY EXAM OF FINGER(S): CPT | Mod: RT

## 2020-11-01 PROCEDURE — 250N000011 HC RX IP 250 OP 636: Performed by: EMERGENCY MEDICINE

## 2020-11-01 PROCEDURE — 90715 TDAP VACCINE 7 YRS/> IM: CPT | Performed by: EMERGENCY MEDICINE

## 2020-11-01 PROCEDURE — 250N000013 HC RX MED GY IP 250 OP 250 PS 637: Performed by: EMERGENCY MEDICINE

## 2020-11-01 PROCEDURE — 99283 EMERGENCY DEPT VISIT LOW MDM: CPT | Mod: 25 | Performed by: EMERGENCY MEDICINE

## 2020-11-01 PROCEDURE — 90471 IMMUNIZATION ADMIN: CPT | Performed by: EMERGENCY MEDICINE

## 2020-11-01 PROCEDURE — 99283 EMERGENCY DEPT VISIT LOW MDM: CPT | Performed by: EMERGENCY MEDICINE

## 2020-11-01 RX ORDER — IBUPROFEN 600 MG/1
600 TABLET, FILM COATED ORAL ONCE
Status: COMPLETED | OUTPATIENT
Start: 2020-11-01 | End: 2020-11-01

## 2020-11-01 RX ADMIN — CLOSTRIDIUM TETANI TOXOID ANTIGEN (FORMALDEHYDE INACTIVATED), CORYNEBACTERIUM DIPHTHERIAE TOXOID ANTIGEN (FORMALDEHYDE INACTIVATED), BORDETELLA PERTUSSIS TOXOID ANTIGEN (GLUTARALDEHYDE INACTIVATED), BORDETELLA PERTUSSIS FILAMENTOUS HEMAGGLUTININ ANTIGEN (FORMALDEHYDE INACTIVATED), BORDETELLA PERTUSSIS PERTACTIN ANTIGEN, AND BORDETELLA PERTUSSIS FIMBRIAE 2/3 ANTIGEN 0.5 ML: 5; 2; 2.5; 5; 3; 5 INJECTION, SUSPENSION INTRAMUSCULAR at 13:22

## 2020-11-01 RX ADMIN — IBUPROFEN 600 MG: 600 TABLET, FILM COATED ORAL at 13:21

## 2020-11-01 NOTE — ED AVS SNAPSHOT
[facility] Emergency Department  [address]  [place] [address]  Phone: [phone]  Fax: [phone]                                    Farshad Gunter   MRN: [id number]    Department: [facility] Emergency Department   Date of Visit: 11/1/2020           After Visit Summary Signature Page    I have received my discharge instructions, and my questions have been answered. I have discussed any challenges I see with this plan with the nurse or doctor.    ..........................................................................................................................................  Patient/Patient Representative Signature      ..........................................................................................................................................  Patient Representative Print Name and Relationship to Patient    ..................................................               ................................................  Date                                   Time    ..........................................................................................................................................  Reviewed by Signature/Title    ...................................................              ..............................................  Date                                               Time          22EPIC Rev 08/18

## 2020-11-01 NOTE — DISCHARGE INSTRUCTIONS
Please make an appointment to follow up with Your Primary Care Provider as needed.    Ice to area of pain.    Elevation.    Tylenol and/or ibuprofen for pain.    Protective splint as needed for comfort.    Return to the emergency department for any problems.

## 2020-11-01 NOTE — ED PROVIDER NOTES
[facility] EMERGENCY DEPARTMENT ([facility])     November 1, 2020    History     Chief Complaint   Patient presents with     Hand Injury     Onset today pinched right middle finger in light rail door, increasing pain and throbbing.     The history is provided by the patient and medical records.     Farshad Gunter is a 44 year old male who presents to the Emergency Department with a right middle finger injury. Patient reports he was getting off the train today when the door closed and pinched his right middle finger in the door. He now has throbbing pain. Patient is right handed. His last tetanus was 3/2010. He states he got an influenza immunization this year.    PAST MEDICAL HISTORY:   Past Medical History:   Diagnosis Date     Diverticulitis      Injury of right hand      Obesity        PAST SURGICAL HISTORY:   Past Surgical History:   Procedure Laterality Date     C LAP,SURG,COLECTOMY, PARTIAL, W/ANAST  11/23/12    [place]     LAPAROSCOPIC CHOLECYSTECTOMY WITH CHOLANGIOGRAMS  6/12/2013    Procedure: LAPAROSCOPIC CHOLECYSTECTOMY WITH CHOLANGIOGRAMS;  Laparoscopic Cholecystectomy with Cholangiograms;  Surgeon: Thalia Levy MD;  Location:  OR     OPEN REDUCTION INTERNAL FIXATION FINGER(S)  2/17/2012    Procedure:OPEN REDUCTION INTERNAL FIXATION FINGER(S); Open Reduction Internal Fixation of Fourth Metacarpal Fracture; Surgeon:LEXIE KENT; Location: OR     ORTHOPEDIC SURGERY      Right hand surgery in 1996.       Past medical history, past surgical history, medications, and allergies were reviewed with the patient. Additional pertinent items: None    FAMILY HISTORY: No family history on file.    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 Additional pertinent items: None      Discharge Medication List as of 11/1/2020  2:43 PM      CONTINUE these medications which have NOT CHANGED    Details   Acetaminophen (TYLENOL PO) Historical      ibuprofen (ADVIL/MOTRIN) 600 MG tablet Take 1 tablet (600 mg) by mouth every 8 hours as needed for moderate pain, Disp-30 tablet, R-0, Local Print      meclizine (ANTIVERT) 25 MG tablet Take 1 tablet (25 mg) by mouth 4 times daily as needed for dizziness, Disp-15 tablet,R-0, Local Print      methylPREDNISolone (MEDROL DOSEPAK) 4 MG tablet therapy pack Follow Package Directions, Disp-21 tablet, R-0, Local Print      polyvinyl alcohol (LIQUIFILM TEARS) 1.4 % ophthalmic solution Place 1 drop Into the left eye as needed for dry eyes, Disp-15 mL,R-0, Local Print      predniSONE (DELTASONE) 20 MG tablet Take three tablets (= 60mg) each day for 5 (five) days, Disp-15 tablet,R-0, Local Print      pseudoePHEDrine (SUDAFED) 30 MG tablet Take 1 tablet (30 mg) by mouth every 4 hours as needed for congestion, Disp-30 tablet, R-0, Local Print      valACYclovir (VALTREX) 1000 mg tablet Take 1 tablet (1,000 mg) by mouth 3 times daily for 7 days, Disp-21 tablet,R-0, Local Print              No Known Allergies     Review of Systems   Musculoskeletal:        Positive for right, middle finger pain   All other systems reviewed and are negative.    A complete review of systems was performed with pertinent positives and negatives noted in the HPI, and all other systems negative.    Physical Exam   BP: (!) 146/85  Pulse: 76  Temp: 97.5  F (36.4  C)  Resp: 16  Weight: 113.9 kg (251 lb)  SpO2: 100 %      Physical Exam  Vitals signs and nursing note reviewed.   Constitutional:       General: He is not in acute distress.     Appearance: He is well-developed. He is not ill-appearing or toxic-appearing.   HENT:      Head: Normocephalic and atraumatic.   Eyes:      General: No scleral icterus.     Pupils: Pupils are equal, round, and  reactive to light.   Neck:      Musculoskeletal: Normal range of motion and neck supple.   Cardiovascular:      Rate and Rhythm: Normal rate.   Pulmonary:      Effort: Pulmonary effort is normal. No respiratory distress.   Musculoskeletal:      Right hand: He exhibits tenderness and bony tenderness. He exhibits normal range of motion. Normal sensation noted.        Hands:       Comments: Sensation is intact distally.  Flexion and extension are intact at the distal interphalangeal joint.     Skin:     General: Skin is warm and dry.      Coloration: Skin is not pale.      Findings: No rash.   Neurological:      Mental Status: He is alert and oriented to person, place, and time.      Sensory: No sensory deficit.   Psychiatric:         Behavior: Behavior normal.         ED Course   1:08 PM  The patient was seen and examined by Dudley Camp MD in Room ED08.      Procedures        Results for orders placed or performed during the hospital encounter of 11/01/20   XR Finger Right G/E 2 Views     Status: None    Narrative    EXAM: XR FINGER RIGHT G/E 2 VIEW  LOCATION: [facility]  DATE/TIME: 11/01/2020, 1:46 PM    INDICATION: Trauma.  COMPARISON: 06/26/2012.      Impression    IMPRESSION: Exam centered at the third finger. No acute bony or soft tissue abnormality. Again seen is ORIF fourth metacarpal fracture with a dorsal screw plate. This fracture now appears completely healed.                  Assessments & Plan (with Medical Decision Making)     This patient presented to the emergency department after injuring his right middle finger.  No significant subungual hematomas noted.  X-ray demonstrates no evidence of fracture.  No clinical evidence to suggest tendon disruption.  At this point I am comfortable discharging him and having him treat this as a contusion.    I have reviewed the nursing notes.    I have reviewed the findings, diagnosis, plan and need for follow up with the patient.    Discharge  Medication List as of 11/1/2020  2:43 PM          Final diagnoses:   Contusion of right middle finger with damage to nail, initial encounter     I, Sarah Richardson, am serving as a trained medical scribe to document services personally performed by Dudley Camp MD, based on the provider's statements to me.      IDudley MD, was physically present and have reviewed and verified the accuracy of this note documented by Sarah Richardson.     11/1/2020   [facility] EMERGENCY DEPARTMENT     Dudley Camp MD  11/03/20 0803

## 2021-02-01 ENCOUNTER — MEDICAL CORRESPONDENCE (OUTPATIENT)
Dept: HEALTH INFORMATION MANAGEMENT | Facility: CLINIC | Age: 46
End: 2021-02-01

## 2021-02-02 ENCOUNTER — TRANSCRIBE ORDERS (OUTPATIENT)
Dept: OTHER | Age: 46
End: 2021-02-02

## 2021-02-02 DIAGNOSIS — H53.8 BLURRED VISION, RIGHT EYE: Primary | ICD-10-CM

## 2021-02-24 ENCOUNTER — OFFICE VISIT (OUTPATIENT)
Dept: OPHTHALMOLOGY | Facility: CLINIC | Age: 46
End: 2021-02-24
Attending: PEDIATRICS
Payer: COMMERCIAL

## 2021-02-24 DIAGNOSIS — H52.13 MYOPIA OF BOTH EYES WITH ASTIGMATISM: ICD-10-CM

## 2021-02-24 DIAGNOSIS — G51.0 BELL'S PALSY: ICD-10-CM

## 2021-02-24 DIAGNOSIS — H52.203 MYOPIA OF BOTH EYES WITH ASTIGMATISM: ICD-10-CM

## 2021-02-24 DIAGNOSIS — H16.212 EXPOSURE KERATOPATHY, LEFT: Primary | ICD-10-CM

## 2021-02-24 DIAGNOSIS — H52.4 PRESBYOPIA: ICD-10-CM

## 2021-02-24 PROCEDURE — G0463 HOSPITAL OUTPT CLINIC VISIT: HCPCS

## 2021-02-24 PROCEDURE — 92014 COMPRE OPH EXAM EST PT 1/>: CPT | Mod: GC | Performed by: OPHTHALMOLOGY

## 2021-02-24 RX ORDER — ERYTHROMYCIN 5 MG/G
0.5 OINTMENT OPHTHALMIC
Qty: 7 G | Refills: 11 | Status: SHIPPED | OUTPATIENT
Start: 2021-02-24

## 2021-02-24 ASSESSMENT — VISUAL ACUITY
OD_PH_SC: 20/25
OS_SC: 20/250
METHOD: SNELLEN - LINEAR
OS_PH_SC: 20/125
OD_SC: 20/40

## 2021-02-24 ASSESSMENT — EXTERNAL EXAM - LEFT EYE: OS_EXAM: NORMAL

## 2021-02-24 ASSESSMENT — REFRACTION_MANIFEST
OS_ADD: +1.75
OS_AXIS: 106
OS_SPHERE: -0.75
OS_CYLINDER: +0.25
OD_SPHERE: -1.00
OD_ADD: +1.75
OD_CYLINDER: +0.50
OD_AXIS: 081

## 2021-02-24 ASSESSMENT — SLIT LAMP EXAM - LIDS: COMMENTS: LID LAXITY

## 2021-02-24 ASSESSMENT — TONOMETRY
IOP_METHOD: ICARE
OS_IOP_MMHG: 10
OD_IOP_MMHG: 18

## 2021-02-24 ASSESSMENT — EXTERNAL EXAM - RIGHT EYE: OD_EXAM: NORMAL

## 2021-02-24 ASSESSMENT — CONF VISUAL FIELD
OD_NORMAL: 1
OS_NORMAL: 1

## 2021-02-24 ASSESSMENT — CUP TO DISC RATIO
OS_RATIO: 0.2
OD_RATIO: 0.2

## 2021-02-24 NOTE — NURSING NOTE
Chief Complaints and History of Present Illnesses   Patient presents with     Eye Pain Left Eye     Chief Complaint(s) and History of Present Illness(es)     Eye Pain Left Eye     Laterality: In left eye              Comments     Pt. States that he was incarcerated about 4 months ago and LE became painful and itchy at the time. Symptoms have been getting progressively worse. VA has become very blurry and LE alcala constantly. RE is starting to have similar symptoms too.   Nicole Jacob COT 9:05 AM February 24, 2021

## 2021-02-24 NOTE — PATIENT INSTRUCTIONS
Warm Compress  1. (Good) Run a clean washcloth under warm water and then place over the eyes for 10 minutes, once or twice a day. Can be used more often, as needed for comfort.  2. (Better) Place ~1 cup of rice in a clean sock and warm up in the microwave for 30-60 seconds until warm but not scalding. Place a dry or wet cloth over the eyes and then the sock on top. Keep in place for 10 minutes. Can be used as often as needed for comfort.   3. Alternatively, there are products available over the counter for performing warm compresses, such as (1) Jacky Moist Heat Compress, (2) Thermal On Dry Eye, or (3) Eye-Press.     Use the antibiotic ointment 4-6x/day and before bedtime in the LEFT eye (prescription sent to pharmacy)    Follow up with your primary doctor to discuss a sleep apnea test.     We will see you back in 2 weeks for recheck.

## 2021-02-24 NOTE — PROGRESS NOTES
CC - Left eye redness and pain    INTERVAL HISTORY - Initial visit. 4-6 months of left eye pain and itchiness. He reports 6 months progressively worsening left eye pain associated with blurry vision, left frontal headache, eye redness, and photophobia. No history of similar. He wears sunglasses indoors because of the photophobia. The pain/headache fluctuate throughout the day. He was incarcerated 11/2020 through 2/22/21. He has tried warm rags which helped with the eye dryness and mattering, but has not helped with his other symptoms.     Of note, the patient was diagnosed with Bell's palsy on 9/24/20 after presenting to the ED with left-sided facial droop. CT head w/o contrast showed no acute pathology, old right cerebellar infarct, and small left posterior scalp hematoma.  He was started on Prednisone 60 mg x5 days, Valtrex 1g TID x7 days, artificial tears prn, and Meclizine. He was to follow up with a primary doctor but no-showed to the appointment. He states that his left-sided facial droop has been stable since then, not worsening but not improving much either. He has not used any eye lubrication. He established care with a new primary care provider, Dr. Evans on 2/1/2021, who prescribed Polytrim which he thinks helped a little but he has finished the bottle.     Additionally, he endorses bilateral eye watering, irritation, and often wakes up with eye crusting in both eyes.       HPI -   Farshad Gunter is a 45 year old year-old patient with a history of left-sided Bell's palsy who presents for left eye pain.    PMH: Bell's palsy left side 9/2020; acanthosis nigricans; history of cold sores (last ~20 years ago)  Last eye exam >30 years ago.     PAST OCULAR SURGERY  None    IMAGING:  None      ASSESSMENT & PLAN    # Exposure keratopathy, LEFT eye   - dx'd Bell's palsy 9/2020 but still symptomatic   - Sxs: irritation, photophobia, redness    - 1-2mm lagophthalmos, prominent papillary conjunctivitis, LSCD, KNV   -  BCVA 20/200 left eye, likely limited by exposure keratopathy      - Recommend establish care with PCP to reassess Bell's palsy diagnosis. Would expect more improvement in facial droop 5 months after onset.   - Start erythromycin ophthalmic ointment QID in the LEFT eye   - Tape eyelid shut at night or use Glad press-n-seal for moisture chamber    # Floppy eyelids   - Snores at night, elevated BMI   - Recommend obstructive sleep apnea evaluation per primary MD    # Myopia of both eyes with astigmatism and presbyopia   - BCVA 20/20 in right eye; left eye vision limited by exposure keratopathy   - Hold glasses Rx for now until exposure keratopathy better managed      return to clinic: 2 weeks for VT      Patient seen and discussed with Dr. Jarod Gillespie MD  Ophthalmology PGY-3  [facility]     Teaching statement:  Complete documentation of historical and exam elements from today's encounter can be found in the full encounter summary report (not reduplicated in this progress note). I personally obtained the chief complaint(s) and history of present illness.  I confirmed and edited as necessary the review of systems, past medical/surgical history, family history, social history, and examination findings as documented by others; and I examined the patient myself. I personally reviewed the relevant tests, images, and reports as documented above.     I formulated and edited as necessary the assessment and plan and discussed the findings and management plan with the patient and family.    Lilia Olivera MD  Comprehensive Ophthalmology & Ocular Pathology  Department of Ophthalmology and Visual Neurosciences  [email or web address]  Pager [phone]

## 2024-01-12 NOTE — ED AVS SNAPSHOT
[facility], Emergency Department    [address]    [place] [address]    Phone:  [phone]    Fax:  [phone]                                       Farshad Gunetr   MRN: [id number]    Department:  [facility], Emergency Department   Date of Visit:  6/30/2017           Patient Information     Date Of Birth          1975        Your diagnoses for this visit were:     Sprain of left ankle, unspecified ligament, initial encounter        You were seen by Jacobo Altamirano MD.        Discharge Instructions       Please make an appointment to follow up with Your Primary Care Provider in 7 days even if entirely better.  You can call to discuss the appropriate follow up timing with your doctor.         Treating Ankle Sprains  Treatment will depend on how bad your sprain is. For a severe sprain, healing may take 3 months or more.  Right after your injury: Use R.I.C.E.    Rest: At first, keep weight off the ankle as much as you can. You may be given crutches to help you walk without putting weight on the ankle.    Ice: Put an ice pack on the ankle for 15 minutes. Remove the pack and wait at least 30 minutes. Repeat for up to 3 days. This helps reduce swelling.    Compression: To reduce swelling and keep the joint stable, you may need to wrap the ankle with an elastic bandage. For more severe sprains, you may need an ankle brace or a cast.    Elevation: To reduce swelling, keep your ankle raised above your heart when you sit or lie down.  Medicine  Your healthcare provider may suggest oral non-steroidal anti-inflammatory medicine (NSAIDs), such as ibuprofen. This relieves the pain and helps reduce any swelling. Be sure to take your medicine as directed.  Contrast baths  After 3 days, soak your ankle in warm water for 30 seconds, then in cool water for 30 seconds. Go back and forth for 5 minutes. Doing this every 2 hours will help keep the swelling down.  Exercises    After about 2 to 3 weeks, you  Bed: ED15  Expected date:   Expected time:   Means of arrival:   Comments:  triage   may be given exercises to strengthen the ligaments and muscles in the ankle. Doing these exercises will help prevent another ankle sprain. Exercises may include standing on your toes and then on your heels and doing ankle curls.  Ankle curls    Sit on the edge of a sturdy table or lie on your back.    Pull your toes toward you. Then point them away from you. Repeat for 2 to 3 minutes.   Date Last Reviewed: 9/28/2015 2000-2017 [employer]. [address] [place] [address]. All rights reserved. This information is not intended as a substitute for professional medical care. Always follow your healthcare professional's instructions.          24 Hour Appointment Hotline       To make an appointment at any [facility], call [phone] ([phone]). If you don't have a family doctor or clinic, we will help you find one. [facility] are conveniently located to serve the needs of you and your family.          ED Discharge Orders     Aircast stirrup           Crutches       Use gait belt during crutch training.                     Review of your medicines      Our records show that you are taking the medicines listed below. If these are incorrect, please call your family doctor or clinic.        Dose / Directions Last dose taken    IBUPROFEN PO   Dose:  800 mg        Take 800 mg by mouth   Refills:  0        TYLENOL PO        Refills:  0                Procedures and tests performed during your visit     Ankle XR, G/E 3 views, left      Orders Needing Specimen Collection     None      Pending Results     No orders found from 6/28/2017 to 7/1/2017.            Pending Culture Results     No orders found from 6/28/2017 to 7/1/2017.            Pending Results Instructions     If you had any lab results that were not finalized at the time of your Discharge, you can call the ED Lab Result RN at [phone]. You will be contacted by this team for any positive Lab results or changes in  "treatment. The nurses are available 7 days a week from 10A to 6:30P.  You can leave a message 24 hours per day and they will return your call.        Thank you for choosing [facility]       Thank you for choosing [facility] for your care. Our goal is always to provide you with excellent care. Hearing back from our patients is one way we can continue to improve our services. Please take a few minutes to complete the written survey that you may receive in the mail after you visit with us. Thank you!        [employer] Information     [email or web address] lets you send messages to your doctor, view your test results, renew your prescriptions, schedule appointments and more. To sign up, go to [email or web address] . Click on \"Log in\" on the left side of the screen, which will take you to the Welcome page. Then click on \"Sign up Now\" on the right side of the page.     You will be asked to enter the access code listed below, as well as some personal information. Please follow the directions to create your username and password.     Your access code is: [email or web address]  Expires: 2017  9:58 PM     Your access code will  in 90 days. If you need help or a new code, please call your [facility] clinic or [phone].        Care EveryWhere ID     This is your Care EveryWhere ID. This could be used by other organizations to access your [facility] medical records  [id number]        Equal Access to Services     FLORENCIA BRONSON : Hadii guillaume Leon, waemily patiño, qaybta ángel guillen, bo barba . So [facility] [phone].    ATENCIÓN: Si habla español, tiene a dalton disposición servicios gratuitos de asistencia lingüística. Fermin al [phone].    We comply with applicable federal civil rights laws and [place] laws. We do not discriminate on the basis of race, color, national origin, age, disability sex, sexual orientation or gender identity.            After Visit Summary       This is your record. " Keep this with you and show to your community pharmacist(s) and doctor(s) at your next visit.